# Patient Record
Sex: MALE | Race: ASIAN | URBAN - METROPOLITAN AREA
[De-identification: names, ages, dates, MRNs, and addresses within clinical notes are randomized per-mention and may not be internally consistent; named-entity substitution may affect disease eponyms.]

---

## 2022-12-20 ENCOUNTER — OFFICE VISIT (OUTPATIENT)
Dept: URBAN - METROPOLITAN AREA CLINIC 25 | Facility: CLINIC | Age: 57
End: 2022-12-20
Payer: SELF-PAY

## 2022-12-20 ENCOUNTER — WEB ENCOUNTER (OUTPATIENT)
Dept: URBAN - METROPOLITAN AREA CLINIC 25 | Facility: CLINIC | Age: 57
End: 2022-12-20

## 2022-12-20 ENCOUNTER — DASHBOARD ENCOUNTERS (OUTPATIENT)
Age: 57
End: 2022-12-20

## 2022-12-20 VITALS
WEIGHT: 163 LBS | SYSTOLIC BLOOD PRESSURE: 125 MMHG | DIASTOLIC BLOOD PRESSURE: 85 MMHG | TEMPERATURE: 97.3 F | BODY MASS INDEX: 24.14 KG/M2 | HEART RATE: 66 BPM | HEIGHT: 69 IN

## 2022-12-20 DIAGNOSIS — K21.9 GERD WITHOUT ESOPHAGITIS: ICD-10-CM

## 2022-12-20 DIAGNOSIS — R10.13 EPIGASTRIC PAIN: ICD-10-CM

## 2022-12-20 DIAGNOSIS — R09.89 GLOBUS SENSATION: ICD-10-CM

## 2022-12-20 PROCEDURE — 99204 OFFICE O/P NEW MOD 45 MIN: CPT | Performed by: INTERNAL MEDICINE

## 2022-12-20 RX ORDER — FAMOTIDINE 40 MG/1
1 TABLET AT BEDTIME TABLET, FILM COATED ORAL ONCE A DAY
Qty: 30 TABLET | Refills: 5 | OUTPATIENT
Start: 2022-12-20

## 2022-12-20 NOTE — HPI-TODAY'S VISIT:
56 yo pt presents for evaluation of epigastric abdominal pain and globus sensation for 3 months including nausea without vomiting. Denies wt loss and denies changes in bowel movement. Sxs initially responded to omeprazole but now is getting worst.

## 2022-12-21 PROBLEM — 266435005: Status: ACTIVE | Noted: 2022-12-20

## 2022-12-21 PROBLEM — 267103008: Status: ACTIVE | Noted: 2022-12-20

## 2022-12-23 ENCOUNTER — OFFICE VISIT (OUTPATIENT)
Dept: URBAN - METROPOLITAN AREA SURGERY CENTER 20 | Facility: SURGERY CENTER | Age: 57
End: 2022-12-23